# Patient Record
Sex: FEMALE | Race: WHITE | NOT HISPANIC OR LATINO | URBAN - METROPOLITAN AREA
[De-identification: names, ages, dates, MRNs, and addresses within clinical notes are randomized per-mention and may not be internally consistent; named-entity substitution may affect disease eponyms.]

---

## 2024-11-04 DIAGNOSIS — Z00.00 ANNUAL PHYSICAL EXAM: ICD-10-CM

## 2024-11-04 DIAGNOSIS — Z11.4 ENCOUNTER FOR SCREENING FOR HIV: ICD-10-CM

## 2024-11-04 DIAGNOSIS — Z11.59 NEED FOR HEPATITIS C SCREENING TEST: ICD-10-CM

## 2024-11-11 ENCOUNTER — LAB (OUTPATIENT)
Dept: LAB | Facility: LAB | Age: 49
End: 2024-11-11
Payer: COMMERCIAL

## 2024-11-11 ENCOUNTER — APPOINTMENT (OUTPATIENT)
Dept: PRIMARY CARE | Facility: CLINIC | Age: 49
End: 2024-11-11
Payer: COMMERCIAL

## 2024-11-11 DIAGNOSIS — Z11.4 ENCOUNTER FOR SCREENING FOR HIV: ICD-10-CM

## 2024-11-11 DIAGNOSIS — Z11.59 NEED FOR HEPATITIS C SCREENING TEST: ICD-10-CM

## 2024-11-11 DIAGNOSIS — Z00.00 ANNUAL PHYSICAL EXAM: ICD-10-CM

## 2024-11-11 LAB
ALBUMIN SERPL BCP-MCNC: 4.2 G/DL (ref 3.4–5)
ALP SERPL-CCNC: 46 U/L (ref 33–110)
ALT SERPL W P-5'-P-CCNC: 22 U/L (ref 7–45)
ANION GAP SERPL CALC-SCNC: 12 MMOL/L (ref 10–20)
AST SERPL W P-5'-P-CCNC: 21 U/L (ref 9–39)
BASOPHILS # BLD AUTO: 0.03 X10*3/UL (ref 0–0.1)
BASOPHILS NFR BLD AUTO: 0.7 %
BILIRUB SERPL-MCNC: 0.5 MG/DL (ref 0–1.2)
BUN SERPL-MCNC: 14 MG/DL (ref 6–23)
CALCIUM SERPL-MCNC: 9 MG/DL (ref 8.6–10.3)
CHLORIDE SERPL-SCNC: 108 MMOL/L (ref 98–107)
CHOLEST SERPL-MCNC: 181 MG/DL (ref 0–199)
CHOLESTEROL/HDL RATIO: 3
CO2 SERPL-SCNC: 25 MMOL/L (ref 21–32)
CREAT SERPL-MCNC: 0.86 MG/DL (ref 0.5–1.05)
EGFRCR SERPLBLD CKD-EPI 2021: 83 ML/MIN/1.73M*2
EOSINOPHIL # BLD AUTO: 0.14 X10*3/UL (ref 0–0.7)
EOSINOPHIL NFR BLD AUTO: 3.4 %
ERYTHROCYTE [DISTWIDTH] IN BLOOD BY AUTOMATED COUNT: 12.1 % (ref 11.5–14.5)
GLUCOSE SERPL-MCNC: 89 MG/DL (ref 74–99)
HCT VFR BLD AUTO: 44 % (ref 36–46)
HCV AB SER QL: NONREACTIVE
HDLC SERPL-MCNC: 60.6 MG/DL
HGB BLD-MCNC: 14.6 G/DL (ref 12–16)
HIV 1+2 AB+HIV1 P24 AG SERPL QL IA: NONREACTIVE
IMM GRANULOCYTES # BLD AUTO: 0.01 X10*3/UL (ref 0–0.7)
IMM GRANULOCYTES NFR BLD AUTO: 0.2 % (ref 0–0.9)
LDLC SERPL CALC-MCNC: 108 MG/DL
LYMPHOCYTES # BLD AUTO: 2.06 X10*3/UL (ref 1.2–4.8)
LYMPHOCYTES NFR BLD AUTO: 49.4 %
MCH RBC QN AUTO: 29.3 PG (ref 26–34)
MCHC RBC AUTO-ENTMCNC: 33.2 G/DL (ref 32–36)
MCV RBC AUTO: 88 FL (ref 80–100)
MONOCYTES # BLD AUTO: 0.28 X10*3/UL (ref 0.1–1)
MONOCYTES NFR BLD AUTO: 6.7 %
NEUTROPHILS # BLD AUTO: 1.65 X10*3/UL (ref 1.2–7.7)
NEUTROPHILS NFR BLD AUTO: 39.6 %
NON HDL CHOLESTEROL: 120 MG/DL (ref 0–149)
NRBC BLD-RTO: 0 /100 WBCS (ref 0–0)
PLATELET # BLD AUTO: 309 X10*3/UL (ref 150–450)
POTASSIUM SERPL-SCNC: 4.8 MMOL/L (ref 3.5–5.3)
PROT SERPL-MCNC: 6.1 G/DL (ref 6.4–8.2)
RBC # BLD AUTO: 4.99 X10*6/UL (ref 4–5.2)
SODIUM SERPL-SCNC: 140 MMOL/L (ref 136–145)
TRIGL SERPL-MCNC: 61 MG/DL (ref 0–149)
TSH SERPL-ACNC: 0.88 MIU/L (ref 0.44–3.98)
VLDL: 12 MG/DL (ref 0–40)
WBC # BLD AUTO: 4.2 X10*3/UL (ref 4.4–11.3)

## 2024-11-11 PROCEDURE — 87389 HIV-1 AG W/HIV-1&-2 AB AG IA: CPT

## 2024-11-11 PROCEDURE — 80053 COMPREHEN METABOLIC PANEL: CPT

## 2024-11-11 PROCEDURE — 85025 COMPLETE CBC W/AUTO DIFF WBC: CPT

## 2024-11-11 PROCEDURE — 36415 COLL VENOUS BLD VENIPUNCTURE: CPT

## 2024-11-11 PROCEDURE — 84443 ASSAY THYROID STIM HORMONE: CPT

## 2024-11-11 PROCEDURE — 86803 HEPATITIS C AB TEST: CPT

## 2024-11-11 PROCEDURE — 80061 LIPID PANEL: CPT

## 2024-11-12 NOTE — RESULT ENCOUNTER NOTE
We will review in the office on November 19:  Cholesterol 181, 60, 108, 61; 2 years ago it was 175, 54, 104, 79  Sugar, kidney, liver, electrolytes, thyroid normal  WBC 4.2 previous 5.4, 4.8 with normal differential  Hepatitis C negative

## 2024-11-19 ENCOUNTER — LAB (OUTPATIENT)
Dept: LAB | Facility: LAB | Age: 49
End: 2024-11-19
Payer: COMMERCIAL

## 2024-11-19 ENCOUNTER — APPOINTMENT (OUTPATIENT)
Dept: PRIMARY CARE | Facility: CLINIC | Age: 49
End: 2024-11-19
Payer: COMMERCIAL

## 2024-11-19 VITALS
WEIGHT: 173 LBS | SYSTOLIC BLOOD PRESSURE: 122 MMHG | DIASTOLIC BLOOD PRESSURE: 80 MMHG | HEIGHT: 64 IN | HEART RATE: 83 BPM | BODY MASS INDEX: 29.53 KG/M2

## 2024-11-19 DIAGNOSIS — R14.0 ABDOMINAL BLOATING: ICD-10-CM

## 2024-11-19 DIAGNOSIS — M25.50 ARTHRALGIA OF MULTIPLE JOINTS: ICD-10-CM

## 2024-11-19 DIAGNOSIS — Z12.31 BREAST CANCER SCREENING BY MAMMOGRAM: ICD-10-CM

## 2024-11-19 DIAGNOSIS — Z12.11 COLON CANCER SCREENING: ICD-10-CM

## 2024-11-19 DIAGNOSIS — Z00.00 ANNUAL PHYSICAL EXAM: Primary | ICD-10-CM

## 2024-11-19 DIAGNOSIS — Z23 NEED FOR INFLUENZA VACCINATION: ICD-10-CM

## 2024-11-19 PROBLEM — R31.9 HEMATURIA: Status: ACTIVE | Noted: 2024-11-19

## 2024-11-19 PROBLEM — D17.22 LIPOMA OF LEFT SHOULDER: Status: RESOLVED | Noted: 2024-11-19 | Resolved: 2024-11-19

## 2024-11-19 PROBLEM — D17.22 LIPOMA OF LEFT SHOULDER: Status: ACTIVE | Noted: 2024-11-19

## 2024-11-19 LAB
ERYTHROCYTE [SEDIMENTATION RATE] IN BLOOD BY WESTERGREN METHOD: 10 MM/H (ref 0–20)
POC APPEARANCE, URINE: CLEAR
POC BILIRUBIN, URINE: NEGATIVE
POC BLOOD, URINE: NEGATIVE
POC COLOR, URINE: YELLOW
POC GLUCOSE, URINE: NEGATIVE MG/DL
POC KETONES, URINE: NEGATIVE MG/DL
POC LEUKOCYTES, URINE: NEGATIVE
POC NITRITE,URINE: NEGATIVE
POC PH, URINE: 6 PH
POC PROTEIN, URINE: NEGATIVE MG/DL
POC SPECIFIC GRAVITY, URINE: 1.02
POC UROBILINOGEN, URINE: 0.2 EU/DL
RHEUMATOID FACT SER NEPH-ACNC: <10 IU/ML (ref 0–15)
URATE SERPL-MCNC: 4 MG/DL (ref 2.3–6.7)

## 2024-11-19 PROCEDURE — 81002 URINALYSIS NONAUTO W/O SCOPE: CPT | Performed by: FAMILY MEDICINE

## 2024-11-19 PROCEDURE — 86038 ANTINUCLEAR ANTIBODIES: CPT

## 2024-11-19 PROCEDURE — 36415 COLL VENOUS BLD VENIPUNCTURE: CPT

## 2024-11-19 PROCEDURE — 84550 ASSAY OF BLOOD/URIC ACID: CPT

## 2024-11-19 PROCEDURE — 90656 IIV3 VACC NO PRSV 0.5 ML IM: CPT | Performed by: FAMILY MEDICINE

## 2024-11-19 PROCEDURE — 93000 ELECTROCARDIOGRAM COMPLETE: CPT | Performed by: FAMILY MEDICINE

## 2024-11-19 PROCEDURE — 99386 PREV VISIT NEW AGE 40-64: CPT | Performed by: FAMILY MEDICINE

## 2024-11-19 PROCEDURE — 85652 RBC SED RATE AUTOMATED: CPT

## 2024-11-19 PROCEDURE — 86431 RHEUMATOID FACTOR QUANT: CPT

## 2024-11-19 PROCEDURE — 86003 ALLG SPEC IGE CRUDE XTRC EA: CPT

## 2024-11-19 PROCEDURE — 3008F BODY MASS INDEX DOCD: CPT | Performed by: FAMILY MEDICINE

## 2024-11-19 PROCEDURE — 90471 IMMUNIZATION ADMIN: CPT | Performed by: FAMILY MEDICINE

## 2024-11-19 PROCEDURE — 1036F TOBACCO NON-USER: CPT | Performed by: FAMILY MEDICINE

## 2024-11-19 ASSESSMENT — PATIENT HEALTH QUESTIONNAIRE - PHQ9
SUM OF ALL RESPONSES TO PHQ9 QUESTIONS 1 AND 2: 0
1. LITTLE INTEREST OR PLEASURE IN DOING THINGS: NOT AT ALL
2. FEELING DOWN, DEPRESSED OR HOPELESS: NOT AT ALL

## 2024-11-19 NOTE — PROGRESS NOTES
Subjective   Patient ID: Kanchan Peng is a 48 y.o. female who presents for Annual Exam.    Past Medical, Surgical, and Family History reviewed and updated in chart.    Reviewed all medications by prescribing practitioner or clinical pharmacist (such as prescriptions, OTCs, herbal therapies and supplements) and documented in the medical record.    HPI  1. Physical exam.    Pap smear: Last performed in January 2021, with negative HPV and normal results.    Mammogram: Last performed in March 2021, with normal findings.    Cologuard: Negative result in March 2021; a repeat test is due.    Immunizations: Consider the influenza vaccine.    2. Arthralgia.    Kanchan has a history of diffuse arthralgia, which improved with the implementation of an elimination diet. She identified a sensitivity to gluten, and upon removing gluten from her diet, she experienced relief from both abdominal bloating and arthralgia, feeling significantly better overall. Recently, she has noticed a recurrence of abdominal bloating despite her efforts to avoid gluten and is curious if there might be another trigger.    Being postmenopausal, she is considering other potential causes. Occasionally, she experiences a sensation akin to a hand squeezing an organ or her colon inside her abdominal cavity, lasting about 15 seconds. She previously consulted a physician regarding this symptom and was advised to have an ultrasound during an episode. However, due to the brief nature of these episodes, arranging such an examination has proven challenging.    Kanchan reports no changes in bowel habits, nor does she have black or bloody stools. She denies experiencing nausea or vomiting. Although she has not undergone a colonoscopy, she is open to having one performed and requests that it be ordered.    Review of Systems  All pertinent positive symptoms are included in the history of present illness.    All other systems have been reviewed and are negative and  "noncontributory to this patient's current ailments.    History reviewed. No pertinent past medical history.  Past Surgical History:   Procedure Laterality Date    OTHER SURGICAL HISTORY  11/23/2020    Elbow fracture repair    OTHER SURGICAL HISTORY  11/23/2020    Cyst incision and drainage    OTHER SURGICAL HISTORY  01/31/2022    Dilation and curettage     Social History     Tobacco Use    Smoking status: Never     Passive exposure: Never    Smokeless tobacco: Never   Substance Use Topics    Alcohol use: Yes    Drug use: Never     Family History   Problem Relation Name Age of Onset    Brain cancer Mother      Sick sinus syndrome Father       Immunization History   Administered Date(s) Administered    Flu vaccine (IIV4), preservative free *Check age/dose* 11/23/2016, 09/22/2018, 11/09/2019, 10/05/2020, 11/09/2021, 11/12/2022    Flu vaccine, trivalent, preservative free, age 6 months and greater (Fluarix/Fluzone/Flulaval) 11/19/2024    Moderna SARS-CoV-2 Vaccination 03/19/2021, 04/16/2021    Pfizer COVID-19 vaccine, bivalent, age 12 years and older (30 mcg/0.3 mL) 11/12/2022    Pfizer Purple Cap SARS-CoV-2 12/03/2021    Tdap vaccine, age 7 year and older (BOOSTRIX, ADACEL) 10/05/2020     No current outpatient medications  Allergies   Allergen Reactions    Metoclopramide Other     Muscle spasms    Penicillins Rash    Prochlorperazine Other     Muscle spasms    Sulfamethoxazole-Trimethoprim Rash       Objective   Vitals:    11/19/24 0734   BP: 122/80   Pulse: 83   Weight: 78.5 kg (173 lb)   Height: 1.626 m (5' 4\")     Body mass index is 29.7 kg/m².    BP Readings from Last 3 Encounters:   11/19/24 122/80   11/09/21 120/86   01/04/21 124/80      Wt Readings from Last 3 Encounters:   11/19/24 78.5 kg (173 lb)   11/09/21 82.1 kg (181 lb)   01/04/21 81.2 kg (179 lb)        Office Visit on 11/19/2024   Component Date Value    POC Color, Urine 11/19/2024 Yellow     POC Appearance, Urine 11/19/2024 Clear     POC Glucose, Urine " 11/19/2024 NEGATIVE     POC Bilirubin, Urine 11/19/2024 NEGATIVE     POC Ketones, Urine 11/19/2024 NEGATIVE     POC Specific Gravity, Ur* 11/19/2024 1.020     POC Blood, Urine 11/19/2024 NEGATIVE     POC PH, Urine 11/19/2024 6.0     POC Protein, Urine 11/19/2024 NEGATIVE     POC Urobilinogen, Urine 11/19/2024 0.2     Poc Nitrite, Urine 11/19/2024 NEGATIVE     POC Leukocytes, Urine 11/19/2024 NEGATIVE    Lab on 11/11/2024   Component Date Value    Cholesterol 11/11/2024 181     HDL-Cholesterol 11/11/2024 60.6     Cholesterol/HDL Ratio 11/11/2024 3.0     LDL Calculated 11/11/2024 108 (H)     VLDL 11/11/2024 12     Triglycerides 11/11/2024 61     Non HDL Cholesterol 11/11/2024 120     Thyroid Stimulating Horm* 11/11/2024 0.88     Glucose 11/11/2024 89     Sodium 11/11/2024 140     Potassium 11/11/2024 4.8     Chloride 11/11/2024 108 (H)     Bicarbonate 11/11/2024 25     Anion Gap 11/11/2024 12     Urea Nitrogen 11/11/2024 14     Creatinine 11/11/2024 0.86     eGFR 11/11/2024 83     Calcium 11/11/2024 9.0     Albumin 11/11/2024 4.2     Alkaline Phosphatase 11/11/2024 46     Total Protein 11/11/2024 6.1 (L)     AST 11/11/2024 21     Bilirubin, Total 11/11/2024 0.5     ALT 11/11/2024 22     WBC 11/11/2024 4.2 (L)     nRBC 11/11/2024 0.0     RBC 11/11/2024 4.99     Hemoglobin 11/11/2024 14.6     Hematocrit 11/11/2024 44.0     MCV 11/11/2024 88     MCH 11/11/2024 29.3     MCHC 11/11/2024 33.2     RDW 11/11/2024 12.1     Platelets 11/11/2024 309     Neutrophils % 11/11/2024 39.6     Immature Granulocytes %,* 11/11/2024 0.2     Lymphocytes % 11/11/2024 49.4     Monocytes % 11/11/2024 6.7     Eosinophils % 11/11/2024 3.4     Basophils % 11/11/2024 0.7     Neutrophils Absolute 11/11/2024 1.65     Immature Granulocytes Ab* 11/11/2024 0.01     Lymphocytes Absolute 11/11/2024 2.06     Monocytes Absolute 11/11/2024 0.28     Eosinophils Absolute 11/11/2024 0.14     Basophils Absolute 11/11/2024 0.03     HIV 1/2 Antigen/Antibody*  11/11/2024 Nonreactive     Hepatitis C AB 11/11/2024 Nonreactive      Physical Exam  CONSTITUTIONAL - well nourished, well developed, looks like stated age, in no acute distress, not ill-appearing, and not tired appearing  SKIN - normal skin color and pigmentation, normal skin turgor without rash, lesions, or nodules visualized; left eyebrow with small light brown nevus, not superficially flaky, regular borders  HEAD - no trauma, normocephalic  EYES - pupils are equal and reactive to light, extraocular muscles are intact, and normal external exam  ENT - TM's intact, no injection, no signs of infection, uvula midline, normal tongue movement and throat normal, no exudate  NECK - supple without rigidity, no neck mass was observed, no thyromegaly or thyroid nodules  CHEST - clear to auscultation, no wheezing, no crackles and no rales, good effort  CARDIAC - regular rate and regular rhythm, no skipped beats, no murmur  ABDOMEN - no organomegaly, soft, nontender, nondistended, normal bowel sounds, no guarding/rebound/rigidity, negative McBurney sign and negative Lipscomb sign  EXTREMITIES - no obvious or evident edema, no obvious or evident deformities  NEUROLOGICAL - normal gait, normal balance, normal motor, no ataxia, DTRs equal and symmetrical; alert, oriented and no focal signs  PSYCHIATRIC - alert, pleasant and cordial, age-appropriate  IMMUNOLOGIC - no cervical lymphadenopathy    Assessment/Plan   Problem List Items Addressed This Visit       Annual physical exam - Primary     Complete history and physical examination was performed  EKG reveals sinus rhythm without acute changes  Blood work was reviewed in the office         Relevant Orders    POCT UA (nonautomated) manually resulted (Completed)    ECG 12 Lead (Completed)    Arthralgia of multiple joints     I suggested an arthritis panel to further evaluate for underlying autoimmune processes  We will notify of test results once available         Relevant Orders     Arthritis Panel (CMS)    Abdominal bloating     It is clear that you found a gluten sensitivity that was triggering your abdominal bloating and diffuse arthralgia in the past, but now it is recurring so we decided to do an allergy profile IgE to determine if there is another food product or products that could be triggering your symptoms         Relevant Orders    Food Allergy Profile IgE     Other Visit Diagnoses       Need for influenza vaccination        All questions were answered and you were counseled on immunization(s) in detail and vaccination was provided    Relevant Orders    Flu vaccine, trivalent, preservative free, age 6 months and greater (Fluraix/Fluzone/Flulaval) (Completed)    Colon cancer screening        Relevant Orders    Colonoscopy Screening; Average Risk Patient    Breast cancer screening by mammogram        Relevant Orders    BI mammo bilateral screening tomosynthesis

## 2024-11-19 NOTE — ASSESSMENT & PLAN NOTE
It is clear that you found a gluten sensitivity that was triggering your abdominal bloating and diffuse arthralgia in the past, but now it is recurring so we decided to do an allergy profile IgE to determine if there is another food product or products that could be triggering your symptoms

## 2024-11-19 NOTE — ASSESSMENT & PLAN NOTE
I suggested an arthritis panel to further evaluate for underlying autoimmune processes  We will notify of test results once available

## 2024-11-19 NOTE — ASSESSMENT & PLAN NOTE
Complete history and physical examination was performed  EKG reveals sinus rhythm without acute changes  Blood work was reviewed in the office

## 2024-11-20 LAB
ANA SER QL HEP2 SUBST: NEGATIVE
CLAM IGE QN: <0.1 KU/L
CODFISH IGE QN: <0.1 KU/L
CORN IGE QN: <0.1
EGG WHITE IGE QN: <0.1 KU/L
MILK IGE QN: <0.1 KU/L
PEANUT IGE QN: <0.1 KU/L
SCALLOP IGE QN: <0.1 KU/L
SESAME SEED IGE QN: <0.1 KU/L
SHRIMP IGE QN: <0.1 KU/L
SOYBEAN IGE QN: <0.1 KU/L
WALNUT IGE QN: <0.1 KU/L
WHEAT IGE QN: <0.1 KU/L

## 2024-11-20 NOTE — RESULT ENCOUNTER NOTE
So far your arthritic panel is negative, we are still waiting on the antinuclear antibody and the food allergy profile

## 2024-11-20 NOTE — RESULT ENCOUNTER NOTE
Antinuclear antibody came back negative, so it appears as though you do not have an autoimmune process.    In regard to the food panel, it looks as though they tested you for 12 different food products, milk being one of them, and it does not appear that you have any specific allergen to them.  If you would like to be a bit more aggressive and talk to an allergist, please let me know so that I can help make that referral for you

## 2025-01-03 ENCOUNTER — HOSPITAL ENCOUNTER (OUTPATIENT)
Dept: RADIOLOGY | Facility: CLINIC | Age: 50
Discharge: HOME | End: 2025-01-03
Payer: COMMERCIAL

## 2025-01-03 DIAGNOSIS — Z12.31 BREAST CANCER SCREENING BY MAMMOGRAM: ICD-10-CM

## 2025-01-03 PROCEDURE — 77063 BREAST TOMOSYNTHESIS BI: CPT

## 2025-01-20 ENCOUNTER — APPOINTMENT (OUTPATIENT)
Dept: GASTROENTEROLOGY | Facility: EXTERNAL LOCATION | Age: 50
End: 2025-01-20
Payer: COMMERCIAL

## 2025-02-10 ENCOUNTER — APPOINTMENT (OUTPATIENT)
Dept: GASTROENTEROLOGY | Facility: EXTERNAL LOCATION | Age: 50
End: 2025-02-10
Payer: COMMERCIAL

## 2025-02-10 DIAGNOSIS — Z12.11 COLON CANCER SCREENING: ICD-10-CM

## 2025-02-10 DIAGNOSIS — D12.2 BENIGN NEOPLASM OF ASCENDING COLON: ICD-10-CM

## 2025-02-10 DIAGNOSIS — Z12.11 COLON CANCER SCREENING: Primary | ICD-10-CM

## 2025-02-10 PROCEDURE — 45385 COLONOSCOPY W/LESION REMOVAL: CPT | Performed by: INTERNAL MEDICINE

## 2025-02-10 PROCEDURE — 88305 TISSUE EXAM BY PATHOLOGIST: CPT | Performed by: STUDENT IN AN ORGANIZED HEALTH CARE EDUCATION/TRAINING PROGRAM

## 2025-02-10 PROCEDURE — 88305 TISSUE EXAM BY PATHOLOGIST: CPT

## 2025-02-11 ENCOUNTER — LAB REQUISITION (OUTPATIENT)
Dept: LAB | Facility: HOSPITAL | Age: 50
End: 2025-02-11
Payer: COMMERCIAL

## 2025-02-11 DIAGNOSIS — Z12.11 ENCOUNTER FOR SCREENING FOR MALIGNANT NEOPLASM OF COLON: ICD-10-CM

## 2025-02-13 LAB
LABORATORY COMMENT REPORT: NORMAL
PATH REPORT.FINAL DX SPEC: NORMAL
PATH REPORT.GROSS SPEC: NORMAL
PATH REPORT.RELEVANT HX SPEC: NORMAL
PATH REPORT.TOTAL CANCER: NORMAL
RESIDENT REVIEW: NORMAL